# Patient Record
Sex: MALE | URBAN - METROPOLITAN AREA
[De-identification: names, ages, dates, MRNs, and addresses within clinical notes are randomized per-mention and may not be internally consistent; named-entity substitution may affect disease eponyms.]

---

## 2021-09-29 ENCOUNTER — OFFICE VISIT (OUTPATIENT)
Dept: URBAN - METROPOLITAN AREA CLINIC 43 | Facility: CLINIC | Age: 41
End: 2021-09-29
Payer: MEDICAID

## 2021-09-29 DIAGNOSIS — H17.9 CORNEAL SCAR: ICD-10-CM

## 2021-09-29 PROCEDURE — 92004 COMPRE OPH EXAM NEW PT 1/>: CPT | Performed by: OPTOMETRIST

## 2021-09-29 PROCEDURE — 92134 CPTRZ OPH DX IMG PST SGM RTA: CPT | Performed by: OPTOMETRIST

## 2021-09-29 ASSESSMENT — VISUAL ACUITY
OS: 20/25
OD: 20/40

## 2021-09-29 ASSESSMENT — KERATOMETRY
OD: 42.50
OS: 42.13

## 2021-09-29 ASSESSMENT — INTRAOCULAR PRESSURE
OS: 13
OD: 13

## 2021-09-29 NOTE — IMPRESSION/PLAN
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  Plan: Pt recently diagnosed with DM and severe HTN, pt has renal failure and is doing dialysis and is on list for kidney transplant,  Extensive PVR/traction and PDR OU, cont care with PCP, refer for retina consult less than 1 week

## 2021-10-01 ENCOUNTER — OFFICE VISIT (OUTPATIENT)
Dept: URBAN - METROPOLITAN AREA CLINIC 10 | Facility: CLINIC | Age: 41
End: 2021-10-01
Payer: MEDICAID

## 2021-10-01 PROCEDURE — 92004 COMPRE OPH EXAM NEW PT 1/>: CPT | Performed by: OPHTHALMOLOGY

## 2021-10-01 PROCEDURE — 92134 CPTRZ OPH DX IMG PST SGM RTA: CPT | Performed by: OPHTHALMOLOGY

## 2021-10-01 ASSESSMENT — INTRAOCULAR PRESSURE
OD: 12
OS: 14

## 2021-10-01 NOTE — IMPRESSION/PLAN
Impression: Combined forms of age-related cataract DM Plan: NOTE:   Pt understands that cataract will progress after vitrectomy surgery. May be in only few wks/mo, but can be delayed by over a year. Will address cataract thereafter w primary eye team PRN.

## 2021-10-01 NOTE — IMPRESSION/PLAN
Impression: DM2 w PDR prolif retinopathy w TRD traction detach OU Plan: hx: [[20y H/o DM -- Recent dx w Severe HTN, pt Renal failure on Dialysis / future Txplt]] TODAY CONFIRMED w RETINA SURGEON - Extensive PVR/traction and PDR OU -- was OFF MEDS FOR MANY YEARS / Lived in [address] [place] [address] BP / BG BETTER. MUST STABILIZE before surgery. TODAY add Avastin OU injx (see DME dx) and RTC 4w for addt'l injx then surg      RTC 4w pos FA baseline - plan Avastin OU again -- THEN plan surgery OS, OD

## 2021-11-05 ENCOUNTER — OFFICE VISIT (OUTPATIENT)
Dept: URBAN - METROPOLITAN AREA CLINIC 10 | Facility: CLINIC | Age: 41
End: 2021-11-05
Payer: MEDICAID

## 2021-11-05 DIAGNOSIS — H25.813 COMBINED FORMS OF AGE-RELATED CATARACT, BILATERAL: ICD-10-CM

## 2021-11-05 PROCEDURE — 92014 COMPRE OPH EXAM EST PT 1/>: CPT | Performed by: OPHTHALMOLOGY

## 2021-11-05 PROCEDURE — 92242 FLUORESCEIN&ICG ANGIOGRAPHY: CPT | Performed by: OPHTHALMOLOGY

## 2021-11-05 PROCEDURE — 92134 CPTRZ OPH DX IMG PST SGM RTA: CPT | Performed by: OPHTHALMOLOGY

## 2021-11-05 PROCEDURE — 92250 FUNDUS PHOTOGRAPHY W/I&R: CPT | Performed by: OPHTHALMOLOGY

## 2021-11-05 ASSESSMENT — INTRAOCULAR PRESSURE
OD: 11
OS: 11

## 2021-11-05 NOTE — IMPRESSION/PLAN
Impression: DM2 w PDR prolif retinopathy w TRD traction detach OU Plan: hx: [[20y H/o DM - Recent dx w HTN w Renal failure on Dialysis / future Txplt]] CONFIRMED w RETINA SURGEON - Extensive PVR/traction and PDR OU -- was OFF MEDS FOR MANY YEARS / Lived in [address] [place] [address] BP / BG BETTER. MUST STABILIZE before surgery. Pt CLAIMS in 2021 IMPROVED. TODAY Avastin OU injx (see DME dx) RTC Plan surgery OS first VIT / Laser / rmv'l Hmg / TRD repair / AFx Future will need Ce/IOL surgery.   Future do OD VIT / TRD repair

## 2021-11-09 ENCOUNTER — ADULT PHYSICAL (OUTPATIENT)
Dept: URBAN - METROPOLITAN AREA CLINIC 10 | Facility: CLINIC | Age: 41
End: 2021-11-09
Payer: MEDICAID

## 2021-11-09 PROCEDURE — 99203 OFFICE O/P NEW LOW 30 MIN: CPT | Performed by: PHYSICIAN ASSISTANT

## 2021-11-12 ENCOUNTER — SURGERY (OUTPATIENT)
Dept: URBAN - METROPOLITAN AREA SURGERY 5 | Facility: SURGERY | Age: 41
End: 2021-11-12
Payer: MEDICAID

## 2021-11-12 PROCEDURE — 67113 REPAIR RETINAL DETACH CPLX: CPT | Performed by: OPHTHALMOLOGY

## 2021-11-12 RX ORDER — OFLOXACIN 3 MG/ML
0.3 % SOLUTION/ DROPS OPHTHALMIC
Qty: 1 | Refills: 1 | Status: INACTIVE
Start: 2021-11-12 | End: 2022-01-31

## 2021-11-13 ENCOUNTER — POST-OPERATIVE VISIT (OUTPATIENT)
Dept: URBAN - METROPOLITAN AREA CLINIC 10 | Facility: CLINIC | Age: 41
End: 2021-11-13
Payer: MEDICAID

## 2021-11-13 PROCEDURE — 99024 POSTOP FOLLOW-UP VISIT: CPT | Performed by: OPTOMETRIST

## 2021-11-13 ASSESSMENT — INTRAOCULAR PRESSURE: OS: 19

## 2021-11-13 NOTE — IMPRESSION/PLAN
Impression: S/P Posterior Vitrectomy (PPVIT)/ Laser / rmv'l Hmg / TRD repair / AFx; Avastin/ STTA OS - 1 Day. Encounter for surgical aftercare following surgery on a sense organ  Z48.810. Excellent post op course   Post operative instructions reviewed - Condition is improving - Plan: MUST start pred / ofloxacin tid os as directed. Keep f/u Dr. Joelle Espinal as scheduled.

## 2021-11-30 ENCOUNTER — POST-OPERATIVE VISIT (OUTPATIENT)
Dept: URBAN - METROPOLITAN AREA CLINIC 10 | Facility: CLINIC | Age: 41
End: 2021-11-30
Payer: MEDICAID

## 2021-11-30 PROCEDURE — 99024 POSTOP FOLLOW-UP VISIT: CPT | Performed by: OPHTHALMOLOGY

## 2021-11-30 PROCEDURE — 92134 CPTRZ OPH DX IMG PST SGM RTA: CPT | Performed by: OPHTHALMOLOGY

## 2021-11-30 ASSESSMENT — INTRAOCULAR PRESSURE
OD: 11
OS: 14

## 2021-11-30 NOTE — IMPRESSION/PLAN
Impression: DM2 w PDR prolif retinopathy w TRD traction detach OU Plan: hx: [[20y H/o DM - Recent dx w HTN w Renal failure on Dialysis / future Txplt]] CONFIRMED w RETINA SURGEON - Extensive PVR/traction and PDR OU -- was OFF MEDS FOR MANY YEARS / Lived in [address] [place] [address] BP / BG BETTER. MUST STABILIZE before surgery. Pt CLAIMS in 2021 IMPROVED. DONE w 1st eye OS SURGERY -- Nov '21 - VIT/LASER PRP/TRD repair / Avastin/ AFX OS 
      TRACTION / TRD PEELED. Attached. NO post op CME / DME 
       TODAY post op IMPROVED OS. ATTACHED. RTC Plan surgery OD 2nd eye  VIT / Laser / rmv'l Hmg / TRD repair / AFx   OD Future will need Ce/IOL surgery.

## 2021-12-07 ENCOUNTER — ADULT PHYSICAL (OUTPATIENT)
Dept: URBAN - METROPOLITAN AREA CLINIC 24 | Facility: CLINIC | Age: 41
End: 2021-12-07
Payer: MEDICAID

## 2021-12-07 PROCEDURE — 99213 OFFICE O/P EST LOW 20 MIN: CPT | Performed by: PHYSICIAN ASSISTANT

## 2021-12-10 ENCOUNTER — SURGERY (OUTPATIENT)
Dept: URBAN - METROPOLITAN AREA SURGERY 5 | Facility: SURGERY | Age: 41
End: 2021-12-10
Payer: MEDICAID

## 2021-12-10 PROCEDURE — 67113 REPAIR RETINAL DETACH CPLX: CPT | Performed by: OPHTHALMOLOGY

## 2021-12-11 ENCOUNTER — POST-OPERATIVE VISIT (OUTPATIENT)
Dept: URBAN - METROPOLITAN AREA CLINIC 10 | Facility: CLINIC | Age: 41
End: 2021-12-11

## 2021-12-11 PROCEDURE — 99024 POSTOP FOLLOW-UP VISIT: CPT | Performed by: OPTOMETRIST

## 2021-12-11 ASSESSMENT — INTRAOCULAR PRESSURE: OD: 19

## 2021-12-11 NOTE — IMPRESSION/PLAN
Impression: S/P Posterior Vitrectomy (PPVIT)/Laser; rmv'l Hmg / TRD repair/STTA; AFx/Avastin OD - 1 Day. Encounter for surgical aftercare following surgery on a sense organ  Z48.810. Post operative instructions reviewed - Plan: Reviewed with patient post op findings. Healing well. Pt review on drops to use:
Ofloxacin TID x 1 week then d/c Taper Pred 1gtt TID x 1 week, BID x 1 week, QD x 1 week then d/c. Reviewed positioning of head as directed by surgical team. 
ATs QID or PRN for comfort.  
Eye shield nightly as instructed and do not rub surgical eye

## 2021-12-23 ENCOUNTER — OFFICE VISIT (OUTPATIENT)
Dept: URBAN - METROPOLITAN AREA CLINIC 24 | Facility: CLINIC | Age: 41
End: 2021-12-23
Payer: MEDICAID

## 2021-12-23 PROCEDURE — 99024 POSTOP FOLLOW-UP VISIT: CPT | Performed by: OPHTHALMOLOGY

## 2021-12-23 PROCEDURE — 92134 CPTRZ OPH DX IMG PST SGM RTA: CPT | Performed by: OPHTHALMOLOGY

## 2021-12-23 ASSESSMENT — INTRAOCULAR PRESSURE
OS: 17
OD: 9

## 2021-12-23 NOTE — IMPRESSION/PLAN
Impression: DM2 w PDR w TRD traction detach OU - Rpr'd w VIT / TRD rpr Lsr Air OS then OD '21 -- RE-ATTACHED Plan: hx: [[20y H/o DM - Recent dx w HTN w Renal failure on Dialysis / future Txplt -- CONFIRMED w RETINA SURGEON - Extensive PVR/traction and PDR OU -- was OFF MEDS FOR MANY YEARS / Lived in [address] [place] [address] BP / BG BETTER. MUST STABILIZE before surgery. . . in 2021 IMPROVED. DONE w OU EXTENSIVE RD TRD repair DM surgery -- 1st eye OS Nov '21 - VIT/LSER PRP/TRD rpr /Avstn/ AFX OS -- THEN 2nd eye OD Dec '21 VIT / Vanessa Pillion / rmv'l Hmg / TRD repair / AFx   OD]] TODAY post op IMPROVED OS and OD --   ATTACHED. Finish Gtts. URGED TIGHTER BG control. RTC GEN eye 4-5w - Plan MRx v. Ce/IOL surgery.    RETINA Optos 2m

## 2021-12-23 NOTE — IMPRESSION/PLAN
Impression: Combined forms of age-related cataract DM Plan: NOTE:   Pt understands that cataract will progress after vitrectomy surgery. May be in only few wks/mo, but can be delayed by over a year. Will address cataract thereafter w primary eye team PRN.   WORSENING cataract now

## 2022-01-14 ENCOUNTER — POST-OPERATIVE VISIT (OUTPATIENT)
Dept: URBAN - METROPOLITAN AREA CLINIC 10 | Facility: CLINIC | Age: 42
End: 2022-01-14
Payer: MEDICAID

## 2022-01-14 PROCEDURE — 99024 POSTOP FOLLOW-UP VISIT: CPT | Performed by: OPTOMETRIST

## 2022-01-14 ASSESSMENT — INTRAOCULAR PRESSURE
OD: 16
OS: 15

## 2022-01-14 NOTE — IMPRESSION/PLAN
Impression: S/P Posterior Vitrectomy (PPVIT)/Laser; rmv'l Hmg / TRD repair/STTA; AFx/Avastin OD - 35 Days. Encounter for surgical aftercare following surgery on a sense organ  Z48.810. Plan: Macula off detachment. Extensive traction RD/diabetic damage prior to retina sx. See Dr. DILL [facility] within 1 week.

## 2022-01-18 ENCOUNTER — OFFICE VISIT (OUTPATIENT)
Dept: URBAN - METROPOLITAN AREA CLINIC 10 | Facility: CLINIC | Age: 42
End: 2022-01-18
Payer: MEDICAID

## 2022-01-18 PROCEDURE — 99024 POSTOP FOLLOW-UP VISIT: CPT | Performed by: OPHTHALMOLOGY

## 2022-01-18 PROCEDURE — 92134 CPTRZ OPH DX IMG PST SGM RTA: CPT | Performed by: OPHTHALMOLOGY

## 2022-01-18 ASSESSMENT — INTRAOCULAR PRESSURE
OS: 10
OD: 8

## 2022-01-18 NOTE — IMPRESSION/PLAN
Impression: DM2 w PDR w TRD traction detach OU - Rpr'd w VIT / TRD rpr Lsr Air OS then OD '21 -- RE-ATTACHED Plan: hx: [[20y H/o DM - Recent dx w HTN w Dialysis / future renal Txplt - PVR/traction w PDR OU severe - was OFF MEDS FOR MANY YRS in [facility] - MUST CONTROL BP/BG - MUST STABILIZE for surg . . in '21 IMPRV'd. DONE w OU EXTENSIVE RD TRD rpr surg - 1st eye OS Nov '21 - VIT/LSER PRP/TRD rpr /Avstn/ AFX OS -- THEN 2nd eye OD Dec '21 VIT / Elyssa Georgis / rmv'l Hmg / TRD repair / AFx OD --- ATTACHED]] TODAY IMPROVED OS     ATTACHED, no TRD recur. No DME. RIGHT EYE RECUR RD MAC OFF TOTAL w PVR severe and worse Cataract URGED TIGHTER BG control. May need DRCR. net maintain injx future RTC  ASAP for Ce/IOL OD then VIT OD to follow      Once Ce/IOL done do REPEAT VIT / PVR peel / Laser / PI / Likely OIL 5k OD

## 2022-01-18 NOTE — IMPRESSION/PLAN
Impression: Combined forms of age-related cataract DM Plan: NOTE:   Pt understands that cataract will progress after vitrectomy surgery. May be in only few wks/mo, but can be delayed by over a year. Will address cataract thereafter w primary eye team PRN.  TODAY confirmed WORSENING cataract Uriel PALMER

## 2022-01-20 ENCOUNTER — OFFICE VISIT (OUTPATIENT)
Dept: URBAN - METROPOLITAN AREA CLINIC 10 | Facility: CLINIC | Age: 42
End: 2022-01-20
Payer: MEDICAID

## 2022-01-20 PROCEDURE — 92014 COMPRE OPH EXAM EST PT 1/>: CPT | Performed by: STUDENT IN AN ORGANIZED HEALTH CARE EDUCATION/TRAINING PROGRAM

## 2022-01-20 ASSESSMENT — INTRAOCULAR PRESSURE
OS: 14
OD: 12

## 2022-01-20 ASSESSMENT — VISUAL ACUITY
OS: 20/40
OD: LP

## 2022-01-20 NOTE — IMPRESSION/PLAN
Impression: DM2 w PDR w TRD traction detach OU - Rpr'd w VIT / TRD rpr Lsr Air OS then OD '21 -- RE-ATTACHED Plan: Continue care with Dr. Edilia Casillas

## 2022-01-20 NOTE — IMPRESSION/PLAN
Impression: Combined forms of age-related cataract DM Plan:  Discussed cataracts with patient. Discussed treatment options. Surgical treatment is recommended. Surgical risks and benefits discussed. Patient elects surgical treatment. Recommend surgery OU, OD first. Aim OD: undecided. Patient will need glasses for full time wear. 
Can due OS when okay with retina

## 2022-01-28 ENCOUNTER — TESTING ONLY (OUTPATIENT)
Dept: URBAN - METROPOLITAN AREA CLINIC 10 | Facility: CLINIC | Age: 42
End: 2022-01-28
Payer: MEDICAID

## 2022-01-28 DIAGNOSIS — E11.3533 TYPE 2 DIABETES MELLITUS WITH PROLIFERATIVE DIABETIC RETINOPATHY WITH TRACTION RETINAL DETACHMENT NOT INVOLVING THE MACULA, BILATERAL: ICD-10-CM

## 2022-01-28 DIAGNOSIS — Z01.818 ENCOUNTER FOR OTHER PREPROCEDURAL EXAMINATION: Primary | ICD-10-CM

## 2022-01-28 PROCEDURE — 99213 OFFICE O/P EST LOW 20 MIN: CPT | Performed by: PHYSICIAN ASSISTANT

## 2022-01-31 ENCOUNTER — PRE-OPERATIVE VISIT (OUTPATIENT)
Dept: URBAN - METROPOLITAN AREA CLINIC 10 | Facility: CLINIC | Age: 42
End: 2022-01-31
Payer: MEDICAID

## 2022-01-31 DIAGNOSIS — H25.11 AGE-RELATED NUCLEAR CATARACT, RIGHT EYE: ICD-10-CM

## 2022-01-31 DIAGNOSIS — H44.512 ABSOLUTE GLAUCOMA, LEFT EYE: ICD-10-CM

## 2022-01-31 DIAGNOSIS — H44.511 ABSOLUTE GLAUCOMA, RIGHT EYE: Primary | ICD-10-CM

## 2022-01-31 PROCEDURE — 99024 POSTOP FOLLOW-UP VISIT: CPT | Performed by: OPHTHALMOLOGY

## 2022-01-31 RX ORDER — KETOROLAC TROMETHAMINE 5 MG/ML
0.5 % SOLUTION OPHTHALMIC
Qty: 5 | Refills: 1 | Status: ACTIVE
Start: 2022-01-31

## 2022-01-31 RX ORDER — OFLOXACIN 3 MG/ML
0.3 % SOLUTION/ DROPS OPHTHALMIC
Qty: 1 | Refills: 1 | Status: ACTIVE
Start: 2022-01-31

## 2022-01-31 RX ORDER — PREDNISOLONE ACETATE 10 MG/ML
1 % SUSPENSION/ DROPS OPHTHALMIC
Qty: 10 | Refills: 1 | Status: INACTIVE
Start: 2022-01-31 | End: 2022-01-31

## 2022-01-31 RX ORDER — PREDNISOLONE ACETATE 10 MG/ML
1 % SUSPENSION/ DROPS OPHTHALMIC
Qty: 10 | Refills: 1 | Status: ACTIVE
Start: 2022-01-31

## 2022-01-31 ASSESSMENT — INTRAOCULAR PRESSURE
OS: 16
OD: 14

## 2022-01-31 NOTE — IMPRESSION/PLAN
Impression: DM2 w PDR w TRD traction detach OU - Rpr'd w VIT / TRD rpr Lsr Air OS then OD '21 -- RE-ATTACHED Plan: Followed by Dr. Yoly Teixeira 

hx: Lucy.More H/o DM - Recent dx w HTN w Renal failure on Dialysis / future Txplt -- CONFIRMED w RETINA SURGEON - Extensive PVR/traction and PDR OU -- was OFF MEDS FOR MANY YEARS / Lived in [address] [place] [address] BP / BG BETTER. MUST STABILIZE before surgery. . . in 2021 IMPROVED. DONE w OU EXTENSIVE RD TRD repair DM surgery -- 1st eye OS Nov '21 - VIT/LSER PRP/TRD rpr /Avstn/ AFX OS -- THEN 2nd eye OD Dec '21 VIT / Elyssa Georgis / rmv'l Hmg / TRD repair / AFx   OD]] TODAY post op IMPROVED OS and OD --   ATTACHED. Finish Gtts. URGED TIGHTER BG control. RTC GEN eye 4-5w - Plan MRx v. Ce/IOL surgery.    RETINA Optos 2m

## 2022-01-31 NOTE — IMPRESSION/PLAN
Impression: Age-related nuclear cataract, right eye: H25.11. Plan: Discussed cataract diagnosis with the patient. Discussed risks, benefits and alternatives to surgery including but not limited to: bleeding, infection, risk of vision loss, loss of the eye, need for other surgery. Patient voiced understanding and wishes to proceed. Patient elects surgical treatment. Patient desires surgery OU. Patient understands the need for glasses after surgery for BCVA. Right eye first (PC IOL (Standard ) (PLANO DISTANCE AIM:)  (NO ORA, NO LRI, NO LENSX) NO UPGRADES (+) BLOCK (+) + Suture wound (will have RD repair in 1 week after cat surgery 10 Minutes +Topical Postop Drops

## 2022-01-31 NOTE — IMPRESSION/PLAN
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 Plan: Pt has Glaucoma      Today's IOP :    14/16     Tmax  :  19 ou Target IOP low to mid teens Pt denies Fhx of Glaucoma Left eye is the better seeing eye VF: not on file C/D:  .2x.2 ou
OCT: not on file Pt denies Sulfa Allergy // Pt denies Lung /Heart dx Plan :
1.  IOP and condition appear stable today. No changes being made to current regimen. Recommend monitoring condition at this time. 
2. Return As scheduled for cat sx od

## 2022-01-31 NOTE — IMPRESSION/PLAN
Impression: Absolute glaucoma, left eye: H44.512. Plan: Will plan to remove Cat OS with topical in near future due to [facility] plaque Will have pt return for CEIOL OS after RD repair OD at Dr. Anni Desir discretion

## 2022-02-11 ENCOUNTER — SURGERY (OUTPATIENT)
Dept: URBAN - METROPOLITAN AREA SURGERY 5 | Facility: SURGERY | Age: 42
End: 2022-02-11
Payer: MEDICAID

## 2022-02-11 DIAGNOSIS — H40.1131 PRIMARY OPEN-ANGLE GLAUCOMA, BILATERAL, MILD STAGE: ICD-10-CM

## 2022-02-11 PROCEDURE — 66982 XCAPSL CTRC RMVL CPLX WO ECP: CPT | Performed by: OPHTHALMOLOGY

## 2022-02-12 ENCOUNTER — POST-OPERATIVE VISIT (OUTPATIENT)
Dept: URBAN - METROPOLITAN AREA CLINIC 10 | Facility: CLINIC | Age: 42
End: 2022-02-12
Payer: MEDICAID

## 2022-02-12 DIAGNOSIS — Z48.810 ENCOUNTER FOR SURGICAL AFTERCARE FOLLOWING SURGERY ON A SENSE ORGAN: Primary | ICD-10-CM

## 2022-02-12 PROCEDURE — 99024 POSTOP FOLLOW-UP VISIT: CPT | Performed by: STUDENT IN AN ORGANIZED HEALTH CARE EDUCATION/TRAINING PROGRAM

## 2022-02-14 NOTE — IMPRESSION/PLAN
Impression: S/P Cataract Extraction by phacoemulsification with IOL placement; suture wound OD - 1 Day. Encounter for surgical aftercare following surgery on a sense organ  Z48.810. Excellent post op course   Post operative instructions reviewed  Plan: good IOP. Restrictions discussed. Call with worsening pain or vision. 

Continue PO gtts as directed

## 2022-02-18 ENCOUNTER — SURGERY (OUTPATIENT)
Dept: URBAN - METROPOLITAN AREA SURGERY 5 | Facility: SURGERY | Age: 42
End: 2022-02-18
Payer: MEDICAID

## 2022-02-18 PROCEDURE — 67113 REPAIR RETINAL DETACH CPLX: CPT | Performed by: OPHTHALMOLOGY

## 2022-02-19 ENCOUNTER — POST-OPERATIVE VISIT (OUTPATIENT)
Dept: URBAN - METROPOLITAN AREA CLINIC 10 | Facility: CLINIC | Age: 42
End: 2022-02-19

## 2022-02-19 PROCEDURE — 99024 POSTOP FOLLOW-UP VISIT: CPT | Performed by: OPTOMETRIST

## 2022-02-19 ASSESSMENT — INTRAOCULAR PRESSURE: OD: 20

## 2022-02-19 NOTE — IMPRESSION/PLAN
Impression:  Encounter for surgical aftercare following surgery on a sense organ  Z48.810. Plan: Healing well. Use drops as prescribed. Patient understands positioning for next 48 hours.

## 2022-03-08 ENCOUNTER — OFFICE VISIT (OUTPATIENT)
Dept: URBAN - METROPOLITAN AREA CLINIC 10 | Facility: CLINIC | Age: 42
End: 2022-03-08
Payer: MEDICAID

## 2022-03-08 DIAGNOSIS — E11.3513 TYPE 2 DIABETES MELLITUS W/ PROLIFERATIVE DIABETIC RETINOPATHY W/ MACULAR EDEMA, BILATERAL: Primary | ICD-10-CM

## 2022-03-08 PROCEDURE — 92134 CPTRZ OPH DX IMG PST SGM RTA: CPT | Performed by: OPHTHALMOLOGY

## 2022-03-08 PROCEDURE — 99024 POSTOP FOLLOW-UP VISIT: CPT | Performed by: OPHTHALMOLOGY

## 2022-03-08 ASSESSMENT — INTRAOCULAR PRESSURE
OD: 10
OS: 10

## 2022-03-08 NOTE — IMPRESSION/PLAN
Impression: DM2 w PDR w TRD traction detach OU - Rpr'd w VIT / TRD rpr Lsr Air OS then OD '21 -- RE-ATTACHED (Repeated VIT / retinectomy/laser 5k OIL OD '22) Plan: hx: [[20y H/o DM - Recent dx w HTN w Dialysis / future renal Txplt - PVR/traction w PDR OU severe - was OFF MEDS FOR MANY YRS in [facility] - MUST CONTROL BP/BG - MUST STABILIZE for surg . . in '21 IMPRV'd. DONE w OU EXTENSIVE RD TRD rpr surg - 1st eye OS Nov '21 - VIT/LSER PRP/TRD rpr /Avstn/ AFX OS -- THEN 2nd eye OD Dec '21 VIT / Elyssa Georgis / rmv'l Hmg / TRD repair / AFx OD --- ATTACHED]] RIGHT EYE RECUR RD MAC OFF TOTAL w PVR severe DONE  (Repeated VIT / retinectomy/laser 5k OIL OD '22) TODAY re-attached OS stable. OD UNDER 5k OIL in place. URGED TIGHTER BG control. May need DRCR. net maintain injx future RTC Gen eye 1mo / RETINA 6-8w Pos Optos and exam.   Future injx?

## 2022-04-05 ENCOUNTER — POST-OPERATIVE VISIT (OUTPATIENT)
Dept: URBAN - METROPOLITAN AREA CLINIC 10 | Facility: CLINIC | Age: 42
End: 2022-04-05
Payer: MEDICAID

## 2022-04-05 PROCEDURE — 99024 POSTOP FOLLOW-UP VISIT: CPT | Performed by: STUDENT IN AN ORGANIZED HEALTH CARE EDUCATION/TRAINING PROGRAM

## 2022-04-05 ASSESSMENT — INTRAOCULAR PRESSURE
OD: 14
OS: 13

## 2022-04-05 NOTE — IMPRESSION/PLAN
Impression:  Encounter for surgical aftercare following surgery on a sense organ  Z48.810. Plan: Reviewed post-op instructions. RTC if any pain or sudden changes to vision.

## 2022-05-03 ENCOUNTER — OFFICE VISIT (OUTPATIENT)
Dept: URBAN - METROPOLITAN AREA CLINIC 10 | Facility: CLINIC | Age: 42
End: 2022-05-03
Payer: MEDICAID

## 2022-05-03 DIAGNOSIS — H44.512 ABSOLUTE GLAUCOMA, LEFT EYE: ICD-10-CM

## 2022-05-03 DIAGNOSIS — E11.3533 TYPE 2 DIABETES MELLITUS WITH PROLIFERATIVE DIABETIC RETINOPATHY WITH TRACTION RETINAL DETACHMENT NOT INVOLVING THE MACULA, BILATERAL: Primary | ICD-10-CM

## 2022-05-03 PROCEDURE — 99024 POSTOP FOLLOW-UP VISIT: CPT | Performed by: OPHTHALMOLOGY

## 2022-05-03 PROCEDURE — 92134 CPTRZ OPH DX IMG PST SGM RTA: CPT | Performed by: OPHTHALMOLOGY

## 2022-05-03 ASSESSMENT — INTRAOCULAR PRESSURE
OD: 15
OS: 15

## 2022-05-03 NOTE — IMPRESSION/PLAN
Impression: DM2 w PDR w TRD traction detach OU - Rpr'd w VIT / TRD rpr Lsr Air OS then OD '21 -- RE-ATTACHED (Repeated VIT / retinectomy/laser 5k OIL OD '22) Plan: hx: [[20y H/o DM - Recent dx w HTN w Dialysis / future renal Txplt - PVR/traction w PDR OU severe - was OFF MEDS FOR MANY YRS in [facility] - MUST CONTROL BP/BG - MUST STABILIZE for surg . . in '21 IMPRV'd. DONE w OU EXTENSIVE RD TRD rpr surg - 1st eye OS Nov '21 - VIT/LSER PRP/TRD rpr /Avstn/ AFX OS -- THEN 2nd eye OD Dec '21 VIT / Elyssa Georgis / rmv'l Hmg / TRD repair / AFx OD --- ATTACHED]] RIGHT EYE RECUR RD MAC OFF TOTAL w PVR severe DONE  (Repeated VIT / retinectomy/laser 5k OIL OD '22) TODAY re-attached OS stable. OD UNDER 5k OIL permanent URGED TIGHTER BG care    May need [facility] net maintain injx future RTC Gen eye 2mo CE / RETINA 3m Pos Optos and exam.   Future injx if recur?

## 2022-05-03 NOTE — IMPRESSION/PLAN
Impression: Absolute glaucoma, left eye: H44.512. Plan: Will plan to remove Cat OS with topical in near future due to [facility] plaque Will have pt return for CEIOL OS now w Dr. Alexa Chung if desired

## 2022-07-07 ENCOUNTER — OFFICE VISIT (OUTPATIENT)
Dept: URBAN - METROPOLITAN AREA CLINIC 10 | Facility: CLINIC | Age: 42
End: 2022-07-07
Payer: MEDICAID

## 2022-07-07 DIAGNOSIS — H44.512 ABSOLUTE GLAUCOMA, LEFT EYE: ICD-10-CM

## 2022-07-07 DIAGNOSIS — E11.3533 TYPE 2 DIABETES MELLITUS WITH PROLIFERATIVE DIABETIC RETINOPATHY WITH TRACTION RETINAL DETACHMENT NOT INVOLVING THE MACULA, BILATERAL: Primary | ICD-10-CM

## 2022-07-07 DIAGNOSIS — H25.812 COMBINED FORMS OF AGE-RELATED CATARACT, LEFT EYE: ICD-10-CM

## 2022-07-07 PROCEDURE — 92014 COMPRE OPH EXAM EST PT 1/>: CPT | Performed by: STUDENT IN AN ORGANIZED HEALTH CARE EDUCATION/TRAINING PROGRAM

## 2022-07-07 PROCEDURE — 92134 CPTRZ OPH DX IMG PST SGM RTA: CPT | Performed by: STUDENT IN AN ORGANIZED HEALTH CARE EDUCATION/TRAINING PROGRAM

## 2022-07-07 ASSESSMENT — VISUAL ACUITY
OD: CF
OS: 20/30

## 2022-07-07 ASSESSMENT — INTRAOCULAR PRESSURE
OS: 14
OD: 14

## 2022-07-07 NOTE — IMPRESSION/PLAN
Impression: Combined forms of age-related cataract, left eye: H25.812. Plan:  Discussed cataracts with patient. Discussed treatment options. Surgical treatment is recommended. Surgical risks and benefits discussed. Patient elects surgical treatment. Recommend surgery OS. Patient is candidate/interested in standard lens, Aim OD: -0.25. Aim OS: -0.25. Glaucoma surgeon recommended. 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

## 2022-07-07 NOTE — IMPRESSION/PLAN
Impression: DM2 w PDR w TRD traction detach OU - Rpr'd w VIT / TRD rpr Lsr Air OS then OD '21 -- RE-ATTACHED (Repeated VIT / retinectomy/laser 5k OIL OD '22) Plan: Flat 360, stable. Continue care with retina.

## 2022-07-07 NOTE — IMPRESSION/PLAN
Impression: Absolute glaucoma, left eye: H44.512.  Plan: Continue care with Dr. Ava Jackson, diagnosed 01/31/22, no current treatment, referred for cataract eval, possible JANEY's

## 2022-08-02 ENCOUNTER — OFFICE VISIT (OUTPATIENT)
Dept: URBAN - METROPOLITAN AREA CLINIC 10 | Facility: CLINIC | Age: 42
End: 2022-08-02
Payer: MEDICAID

## 2022-08-02 DIAGNOSIS — H44.512 ABSOLUTE GLAUCOMA, LEFT EYE: ICD-10-CM

## 2022-08-02 DIAGNOSIS — E11.3533 TYPE 2 DIABETES MELLITUS WITH PROLIFERATIVE DIABETIC RETINOPATHY WITH TRACTION RETINAL DETACHMENT NOT INVOLVING THE MACULA, BILATERAL: Primary | ICD-10-CM

## 2022-08-02 PROCEDURE — 92014 COMPRE OPH EXAM EST PT 1/>: CPT | Performed by: OPHTHALMOLOGY

## 2022-08-02 PROCEDURE — 92134 CPTRZ OPH DX IMG PST SGM RTA: CPT | Performed by: OPHTHALMOLOGY

## 2022-08-02 PROCEDURE — 92250 FUNDUS PHOTOGRAPHY W/I&R: CPT | Performed by: OPHTHALMOLOGY

## 2022-08-02 ASSESSMENT — INTRAOCULAR PRESSURE
OS: 8
OD: 13

## 2022-08-02 NOTE — IMPRESSION/PLAN
Impression: Absolute glaucoma [place] OS Plan: Will plan to remove Cat OS with Dr. Ramya Bolton soon. IOP care as well.

## 2022-08-02 NOTE — IMPRESSION/PLAN
Impression: DM2 PDR w TRD detach OU -- Rpr'd w VIT/ TRD rpr Lsr Air OS then OD '21 -- RE-ATTACHED (Repeat VIT /retinectomy/lsr 5k OIL OD '22) Plan: hx: [[20y H/o DM - Recent dx w HTN w Dialysis / future renal Txplt - PVR/traction w PDR OU severe - was OFF MEDS FOR MANY YRS in [facility] - MUST CONTROL BP/BG - MUST STABILIZE for surg . . in '21 IMPRV'd. DONE w OU EXTENSIVE RD TRD rpr surg - 1st eye OS Nov '21 - VIT/LSER PRP/TRD rpr /Avstn/ AFX OS -- THEN 2nd eye OD Dec '21 VIT / Ashleigh Violeta / rmv'l Hmg / TRD repair / AFx OD --- ATTACHED]] RIGHT EYE RECUR RD MAC OFF TOTAL w PVR severe -- NOW REPAIRED 
        (Repeated VIT / retinectomy/laser 5k OIL OD '22) TODAY OS re-attached stable. OD UNDER 5k OIL permanent OIL OD 
    URGED TIGHTER BG care   May need [facility] net inj if Hmg  future ? RTC Ce/IOL and IOP Care as planned Dr. Alvarez Gathers RTC RETINA 4m Pos Optos and exam.   Future injx if recur hmg / DME ?

## 2022-08-03 ENCOUNTER — ADULT PHYSICAL (OUTPATIENT)
Dept: URBAN - METROPOLITAN AREA CLINIC 10 | Facility: CLINIC | Age: 42
End: 2022-08-03
Payer: MEDICAID

## 2022-08-03 DIAGNOSIS — H25.812 COMBINED FORMS OF AGE-RELATED CATARACT, LEFT EYE: ICD-10-CM

## 2022-08-03 DIAGNOSIS — Z01.818 ENCOUNTER FOR OTHER PREPROCEDURAL EXAMINATION: Primary | ICD-10-CM

## 2022-08-03 PROCEDURE — 99213 OFFICE O/P EST LOW 20 MIN: CPT | Performed by: PHYSICIAN ASSISTANT

## 2022-08-10 ENCOUNTER — PRE-OPERATIVE VISIT (OUTPATIENT)
Dept: URBAN - METROPOLITAN AREA CLINIC 24 | Facility: CLINIC | Age: 42
End: 2022-08-10
Payer: MEDICAID

## 2022-08-10 DIAGNOSIS — H25.11 AGE-RELATED NUCLEAR CATARACT, RIGHT EYE: ICD-10-CM

## 2022-08-10 DIAGNOSIS — H44.512 ABSOLUTE GLAUCOMA, LEFT EYE: ICD-10-CM

## 2022-08-10 DIAGNOSIS — H25.812 COMBINED FORMS OF AGE-RELATED CATARACT, LEFT EYE: ICD-10-CM

## 2022-08-10 DIAGNOSIS — E11.3533 TYPE 2 DIABETES MELLITUS WITH PROLIFERATIVE DIABETIC RETINOPATHY WITH TRACTION RETINAL DETACHMENT NOT INVOLVING THE MACULA, BILATERAL: ICD-10-CM

## 2022-08-10 DIAGNOSIS — H44.511 ABSOLUTE GLAUCOMA, RIGHT EYE: Primary | ICD-10-CM

## 2022-08-10 PROCEDURE — 99214 OFFICE O/P EST MOD 30 MIN: CPT | Performed by: OPHTHALMOLOGY

## 2022-08-10 ASSESSMENT — INTRAOCULAR PRESSURE
OS: 8
OD: 17

## 2022-08-10 ASSESSMENT — VISUAL ACUITY
OD: 20/80
OS: 20/20

## 2022-08-10 ASSESSMENT — KERATOMETRY
OS: 42.16
OD: 42.19

## 2022-08-10 NOTE — IMPRESSION/PLAN
Impression: Absolute glaucoma, left eye: H44.512. Plan: Will plan to remove Cat OS with topical in near future due to [facility] plaque Will have pt return for CEIOL OS after RD repair OD at Dr. An Alcala discretion

## 2022-08-10 NOTE — IMPRESSION/PLAN
Impression: Combined forms of age-related cataract, left eye: H25.812. Plan: (( (( OS AIM -0.25,   DEXYCU 1st choice, Topical, ORA - if pt elects, NO LenSx) - Min 5-10 Discussed cataract diagnosis with the patient. Appropriate testing ordered for cataract diagnosis prior to Preop. Risks and benefits of surgical treatment were discussed and understood. Patient desires surgical treatment. Discussed lens options with pt and pt is ok with wearing glasses after surgery. Patient desires surgery to proceed with surgery. Both eyes examined, medically necessary due to impact in activities of daily living. Discussed higher risks with smaller pupil and discussed iris stretch and higher risks of bleeding.  Discussed there is a chance of developing capsular haze after surgery, which may be corrected with laser/yag

## 2022-08-10 NOTE — IMPRESSION/PLAN
Impression: DM2 w PDR w TRD traction detach OU - Rpr'd w VIT / TRD rpr Lsr Air OS then OD '21 -- RE-ATTACHED Plan: Followed by Dr. Christopher Mcfarland 

hx: Egeria.Buff H/o DM - Recent dx w HTN w Renal failure on Dialysis / future Txplt -- CONFIRMED w RETINA SURGEON - Extensive PVR/traction and PDR OU -- was OFF MEDS FOR MANY YEARS / Lived in [address] [place] [address] BP / BG BETTER. MUST STABILIZE before surgery. . . in 2021 IMPROVED. DONE w OU EXTENSIVE RD TRD repair DM surgery -- 1st eye OS Nov '21 - VIT/LSER PRP/TRD rpr /Avstn/ AFX OS -- THEN 2nd eye OD Dec '21 VIT / [employer] / rmv'l Hmg / TRD repair / AFx   OD]] TODAY post op IMPROVED OS and OD --   ATTACHED. Finish Gtts. URGED TIGHTER BG control. RTC GEN eye 4-5w - Plan MRx v. Ce/IOL surgery.    RETINA Optos 2m

## 2022-08-10 NOTE — IMPRESSION/PLAN
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 Plan: Pt has Glaucoma      Today's IOP :    17/8     Tmax  :  19 ou Target IOP low to mid teens Pt denies Fhx of Glaucoma Left eye is the better seeing eye VF: not on file C/D:  .4 OD // 0.35 OS
OCT: not on file Pt denies Sulfa Allergy // Pt denies Lung /Heart dx Plan :
1.  IOP and condition appear stable today. No changes being made to current regimen. Recommend monitoring condition at this time. 
2. Return As scheduled for cat sx oS

## 2022-08-15 ENCOUNTER — SURGERY (OUTPATIENT)
Dept: URBAN - METROPOLITAN AREA SURGERY 5 | Facility: SURGERY | Age: 42
End: 2022-08-15
Payer: MEDICAID

## 2022-08-15 DIAGNOSIS — H25.812 COMBINED FORMS OF AGE-RELATED CATARACT, LEFT EYE: Primary | ICD-10-CM

## 2022-08-15 PROCEDURE — 66984 XCAPSL CTRC RMVL W/O ECP: CPT | Performed by: OPHTHALMOLOGY

## 2022-08-17 ENCOUNTER — POST-OPERATIVE VISIT (OUTPATIENT)
Dept: URBAN - METROPOLITAN AREA CLINIC 10 | Facility: CLINIC | Age: 42
End: 2022-08-17

## 2022-08-17 DIAGNOSIS — Z96.1 PRESENCE OF INTRAOCULAR LENS: Primary | ICD-10-CM

## 2022-08-17 PROCEDURE — 99024 POSTOP FOLLOW-UP VISIT: CPT | Performed by: OPTOMETRIST

## 2022-08-17 ASSESSMENT — INTRAOCULAR PRESSURE: OS: 8

## 2022-08-17 NOTE — IMPRESSION/PLAN
Impression: S/P Cataract Extraction/IOL OS - 2 Days. Presence of intraocular lens  Z96.1.  Excellent post op course   Post operative instructions reviewed - Plan: RTC for 3wk PO

## 2022-09-06 ENCOUNTER — POST-OPERATIVE VISIT (OUTPATIENT)
Dept: URBAN - METROPOLITAN AREA CLINIC 10 | Facility: CLINIC | Age: 42
End: 2022-09-06
Payer: MEDICAID

## 2022-09-06 DIAGNOSIS — Z96.1 PRESENCE OF INTRAOCULAR LENS: Primary | ICD-10-CM

## 2022-09-06 PROCEDURE — 99024 POSTOP FOLLOW-UP VISIT: CPT | Performed by: STUDENT IN AN ORGANIZED HEALTH CARE EDUCATION/TRAINING PROGRAM

## 2022-09-06 ASSESSMENT — VISUAL ACUITY
OD: 20/125
OS: 20/30

## 2022-09-06 ASSESSMENT — INTRAOCULAR PRESSURE
OS: 13
OD: 14

## 2022-09-06 NOTE — IMPRESSION/PLAN
Impression:  Presence of intraocular lens  Z96.1. Post operative instructions reviewed - Plan: Reviewed post-op instructions. RTC if any pain or sudden changes to vision. 
Recommended glasses Rx for full-time wear

## 2023-02-21 ENCOUNTER — OFFICE VISIT (OUTPATIENT)
Dept: URBAN - METROPOLITAN AREA CLINIC 10 | Facility: CLINIC | Age: 43
End: 2023-02-21
Payer: MEDICAID

## 2023-02-21 DIAGNOSIS — H44.512 ABSOLUTE GLAUCOMA, LEFT EYE: ICD-10-CM

## 2023-02-21 DIAGNOSIS — E11.3533 TYPE 2 DIABETES MELLITUS WITH PROLIFERATIVE DIABETIC RETINOPATHY WITH TRACTION RETINAL DETACHMENT NOT INVOLVING THE MACULA, BILATERAL: Primary | ICD-10-CM

## 2023-02-21 PROCEDURE — 92014 COMPRE OPH EXAM EST PT 1/>: CPT | Performed by: OPHTHALMOLOGY

## 2023-02-21 PROCEDURE — 92134 CPTRZ OPH DX IMG PST SGM RTA: CPT | Performed by: OPHTHALMOLOGY

## 2023-02-21 ASSESSMENT — INTRAOCULAR PRESSURE
OD: 18
OS: 16

## 2023-02-21 NOTE — IMPRESSION/PLAN
Impression: DM2 PDR w TRD detach OU -- Rpr'd w VIT/ TRD rpr Lsr Air OS then OD '21 -- RE-ATTACHED (Repeat VIT /retinectomy/lsr 5k OIL OD '22) Plan: hx: [[20y H/o DM - Recent dx w HTN w Dialysis / future renal Txplt - PVR/traction w PDR OU severe - was OFF MEDS FOR MANY YRS in [facility] - MUST CONTROL BP/BG - MUST STABILIZE for surg . . in '21 IMPRV'd. DONE w OU EXTENSIVE RD TRD rpr surg - 1st eye OS Nov '21 - VIT/LSER PRP/TRD rpr /Avstn/ AFX OS -- THEN 2nd eye OD Dec '21 VIT / Ernestineyth Vianey / rmv'l Hmg / TRD repair / AFx OD --- ATTACHED]] RIGHT EYE RECUR RD MAC OFF TOTAL w PVR severe -- NOW REPAIRED 
        (Repeat VIT / retinectomy/laser 5k OIL OD '22 -- LEAVE OIL OD) OS Re-attached stable. Viki Diaz good [facility] ---  OD UNDER 5k OIL permanent OIL OD 
    URGED BG care  -- Monitor if recur HMG or DME. KEEP Gen eye f/u 
RTC RETINA 12m plan Optos and exam.   GEN eye care RTC 4w.   Re-establish

## 2023-02-21 NOTE — IMPRESSION/PLAN
Impression: Absolute glaucoma, left Plan:  Sari Day  -- keep f/u [facility] eye care

## 2023-04-25 ENCOUNTER — OFFICE VISIT (OUTPATIENT)
Dept: URBAN - METROPOLITAN AREA CLINIC 10 | Facility: CLINIC | Age: 43
End: 2023-04-25
Payer: MEDICAID

## 2023-04-25 DIAGNOSIS — H44.512 ABSOLUTE GLAUCOMA, LEFT EYE: ICD-10-CM

## 2023-04-25 DIAGNOSIS — E11.3533 TYPE 2 DIABETES MELLITUS WITH PROLIFERATIVE DIABETIC RETINOPATHY WITH TRACTION RETINAL DETACHMENT NOT INVOLVING THE MACULA, BILATERAL: ICD-10-CM

## 2023-04-25 DIAGNOSIS — H26.493 OTHER SECONDARY CATARACT, BILATERAL: Primary | ICD-10-CM

## 2023-04-25 DIAGNOSIS — H40.013 OPEN ANGLE WITH BORDERLINE FINDINGS, LOW RISK, BILATERAL: ICD-10-CM

## 2023-04-25 PROCEDURE — 92134 CPTRZ OPH DX IMG PST SGM RTA: CPT | Performed by: OPTOMETRIST

## 2023-04-25 PROCEDURE — 92014 COMPRE OPH EXAM EST PT 1/>: CPT | Performed by: OPTOMETRIST

## 2023-04-25 PROCEDURE — 92133 CPTRZD OPH DX IMG PST SGM ON: CPT | Performed by: OPTOMETRIST

## 2023-04-25 ASSESSMENT — INTRAOCULAR PRESSURE
OD: 19
OS: 18

## 2023-04-25 ASSESSMENT — VISUAL ACUITY
OS: 20/20
OD: 20/100

## 2023-04-25 NOTE — IMPRESSION/PLAN
Impression: Other secondary cataract, bilateral: H26.493. Plan: Discussed diagnosis in detail with patient. Discussed treatment options with patient. Discussed risks and benefits and patient understands. Pt. elects YAG laser OS. OD vision limited by retina. Monitor.

## 2023-04-25 NOTE — IMPRESSION/PLAN
Impression: Open angle with borderline findings, low risk, bilateral: H40.013. Plan: Pt. has seen glc. No h/o tx. IOP normal OU today. Tests limited by PRP/RD repair OU. NFL OCT: early inf thinning OD, early sup thinning and thin inf OS. Cont. to monitor s tx. RTC 6 months for IOP check and order HVF 24-2.

## 2023-04-25 NOTE — IMPRESSION/PLAN
Impression: DM2 PDR w TRD detach OU -- Rpr'd w VIT/ TRD rpr Lsr Air OS then OD '21 -- RE-ATTACHED (Repeat VIT /retinectomy/lsr 5k OIL OD '22) Plan: hx: [[20y H/o DM - Recent dx w HTN w Dialysis / future renal Txplt - PVR/traction w PDR OU severe - was OFF MEDS FOR MANY YRS in [facility] - MUST CONTROL BP/BG - MUST STABILIZE for surg . . in '21 IMPRV'd. DONE w OU EXTENSIVE RD TRD rpr surg - 1st eye OS Nov '21 - VIT/LSER PRP/TRD rpr /Avstn/ AFX OS -- THEN 2nd eye OD Dec '21 VIT / Simona Roughen / rmv'l Hmg / TRD repair / AFx OD --- ATTACHED]] Stable today. Will leave oil OD per retina.

## 2023-07-11 ENCOUNTER — OFFICE VISIT (OUTPATIENT)
Dept: URBAN - METROPOLITAN AREA CLINIC 10 | Facility: CLINIC | Age: 43
End: 2023-07-11
Payer: COMMERCIAL

## 2023-07-11 DIAGNOSIS — H40.013 OPEN ANGLE WITH BORDERLINE FINDINGS, LOW RISK, BILATERAL: ICD-10-CM

## 2023-07-11 DIAGNOSIS — E11.3533 TYPE 2 DIABETES MELLITUS WITH PROLIFERATIVE DIABETIC RETINOPATHY WITH TRACTION RETINAL DETACHMENT NOT INVOLVING THE MACULA, BILATERAL: Primary | ICD-10-CM

## 2023-07-11 DIAGNOSIS — H26.493 OTHER SECONDARY CATARACT, BILATERAL: ICD-10-CM

## 2023-07-11 DIAGNOSIS — H40.023 OPEN ANGLE WITH BORDERLINE FINDINGS, HIGH RISK, BILATERAL: ICD-10-CM

## 2023-07-11 PROCEDURE — 99213 OFFICE O/P EST LOW 20 MIN: CPT | Performed by: OPTOMETRIST

## 2023-07-11 PROCEDURE — 92134 CPTRZ OPH DX IMG PST SGM RTA: CPT | Performed by: OPTOMETRIST

## 2023-07-11 ASSESSMENT — INTRAOCULAR PRESSURE
OS: 33
OD: 27
OD: 25
OS: 34

## 2023-07-11 NOTE — IMPRESSION/PLAN
Impression: Other secondary cataract, bilateral: H26.493. Plan: Discussed diagnosis in detail with patient. Discussed treatment options with patient. Discussed risks and benefits and patient understands. Pt. will consider YAG laser OS. OD vision limited by retina. Monitor.

## 2023-07-11 NOTE — IMPRESSION/PLAN
Impression: DM2 PDR w TRD detach OU -- Rpr'd w VIT/ TRD rpr Lsr Air OS then OD '21 -- RE-ATTACHED (Repeat VIT /retinectomy/lsr 5k OIL OD '22) Plan: hx: [[20y H/o DM - Recent dx w HTN w Dialysis / future renal Txplt - PVR/traction w PDR OU severe - was OFF MEDS FOR MANY YRS in [facility] - MUST CONTROL BP/BG - MUST STABILIZE for surg . . in '21 IMPRV'd. DONE w OU EXTENSIVE RD TRD rpr surg - 1st eye OS Nov '21 - VIT/LSER PRP/TRD rpr /Avstn/ AFX OS -- THEN 2nd eye OD Dec '21 VIT / [employer] / rmv'l Hmg / TRD repair / AFx OD --- ATTACHED]] Stable today. Reassurance. Will leave oil OD per retina.

## 2023-07-11 NOTE — IMPRESSION/PLAN
Impression: Open angle with borderline findings, high risk, bilateral: H40.023. Plan: Pt. has seen glc. No h/o tx. IOP elevated  OU today. No rubeosis evident OU. Tests limited by PRP/RD repair OU. NFL OCT: early inf thinning OD, early sup thinning and thin inf OS. Begin [place] qhs OU. RTC 3 weeks for IOP check.

## 2023-10-24 ENCOUNTER — OFFICE VISIT (OUTPATIENT)
Dept: URBAN - METROPOLITAN AREA CLINIC 10 | Facility: CLINIC | Age: 43
End: 2023-10-24
Payer: COMMERCIAL

## 2023-10-24 DIAGNOSIS — E11.3533 TYPE 2 DIABETES MELLITUS WITH PROLIFERATIVE DIABETIC RETINOPATHY WITH TRACTION RETINAL DETACHMENT NOT INVOLVING THE MACULA, BILATERAL: ICD-10-CM

## 2023-10-24 DIAGNOSIS — H26.493 OTHER SECONDARY CATARACT, BILATERAL: ICD-10-CM

## 2023-10-24 DIAGNOSIS — H40.1134 PRIMARY OPEN-ANGLE GLAUCOMA, INDETERMINATE, BILATERAL: Primary | ICD-10-CM

## 2023-10-24 DIAGNOSIS — H40.023 OPEN ANGLE WITH BORDERLINE FINDINGS, HIGH RISK, BILATERAL: ICD-10-CM

## 2023-10-24 PROCEDURE — 92020 GONIOSCOPY: CPT | Performed by: OPTOMETRIST

## 2023-10-24 PROCEDURE — 92083 EXTENDED VISUAL FIELD XM: CPT | Performed by: OPTOMETRIST

## 2023-10-24 PROCEDURE — 99214 OFFICE O/P EST MOD 30 MIN: CPT | Performed by: OPTOMETRIST

## 2023-10-24 ASSESSMENT — INTRAOCULAR PRESSURE
OS: 44
OS: 45
OD: 26

## 2023-11-01 ENCOUNTER — OFFICE VISIT (OUTPATIENT)
Dept: URBAN - METROPOLITAN AREA CLINIC 10 | Facility: CLINIC | Age: 43
End: 2023-11-01
Payer: COMMERCIAL

## 2023-11-01 DIAGNOSIS — H26.493 OTHER SECONDARY CATARACT, BILATERAL: ICD-10-CM

## 2023-11-01 DIAGNOSIS — E11.3533 TYPE 2 DIABETES MELLITUS WITH PROLIFERATIVE DIABETIC RETINOPATHY WITH TRACTION RETINAL DETACHMENT NOT INVOLVING THE MACULA, BILATERAL: ICD-10-CM

## 2023-11-01 DIAGNOSIS — H40.1134 PRIMARY OPEN-ANGLE GLAUCOMA, INDETERMINATE, BILATERAL: Primary | ICD-10-CM

## 2023-11-01 PROCEDURE — 92133 CPTRZD OPH DX IMG PST SGM ON: CPT | Performed by: OPTOMETRIST

## 2023-11-01 PROCEDURE — 92134 CPTRZ OPH DX IMG PST SGM RTA: CPT | Performed by: OPTOMETRIST

## 2023-11-01 PROCEDURE — 99213 OFFICE O/P EST LOW 20 MIN: CPT | Performed by: OPTOMETRIST

## 2023-11-01 RX ORDER — BRINZOLAMIDE/BRIMONIDINE TARTRATE 10; 2 MG/ML; MG/ML
SUSPENSION/ DROPS OPHTHALMIC
Qty: 2.5 | Refills: 3 | Status: ACTIVE
Start: 2023-11-01

## 2023-11-01 RX ORDER — LATANOPROSTENE BUNOD 0.24 MG/ML
0.024 % SOLUTION/ DROPS OPHTHALMIC
Qty: 2.5 | Refills: 3 | Status: ACTIVE
Start: 2023-11-01

## 2023-11-01 ASSESSMENT — INTRAOCULAR PRESSURE
OS: 19
OS: 20
OD: 20
OD: 18

## 2023-11-01 ASSESSMENT — VISUAL ACUITY
OS: 20/60
OD: 20/150

## 2023-12-18 ENCOUNTER — SURGERY (OUTPATIENT)
Dept: URBAN - METROPOLITAN AREA SURGERY 5 | Facility: SURGERY | Age: 43
End: 2023-12-18
Payer: COMMERCIAL

## 2023-12-18 PROCEDURE — 66821 AFTER CATARACT LASER SURGERY: CPT | Performed by: OPHTHALMOLOGY

## 2024-01-24 ENCOUNTER — POST-OPERATIVE VISIT (OUTPATIENT)
Dept: URBAN - METROPOLITAN AREA CLINIC 10 | Facility: CLINIC | Age: 44
End: 2024-01-24
Payer: COMMERCIAL

## 2024-01-24 DIAGNOSIS — H40.1133 PRIMARY OPEN-ANGLE GLAUCOMA, SEVERE STAGE, BILATERAL: Primary | ICD-10-CM

## 2024-01-24 DIAGNOSIS — E11.3533 TYPE 2 DIABETES MELLITUS WITH PROLIFERATIVE DIABETIC RETINOPATHY WITH TRACTION RETINAL DETACHMENT NOT INVOLVING THE MACULA, BILATERAL: ICD-10-CM

## 2024-01-24 DIAGNOSIS — H26.491 OTHER SECONDARY CATARACT, RIGHT EYE: ICD-10-CM

## 2024-01-24 DIAGNOSIS — H52.223 REGULAR ASTIGMATISM, BILATERAL: ICD-10-CM

## 2024-01-24 PROCEDURE — 99213 OFFICE O/P EST LOW 20 MIN: CPT | Performed by: OPTOMETRIST

## 2024-01-24 PROCEDURE — 92083 EXTENDED VISUAL FIELD XM: CPT | Performed by: OPTOMETRIST

## 2024-01-24 RX ORDER — BRINZOLAMIDE/BRIMONIDINE TARTRATE 10; 2 MG/ML; MG/ML
SUSPENSION/ DROPS OPHTHALMIC
Qty: 5 | Refills: 3 | Status: ACTIVE
Start: 2024-01-24

## 2024-01-24 ASSESSMENT — INTRAOCULAR PRESSURE
OD: 23
OS: 23
OD: 21
OS: 24

## 2024-01-24 ASSESSMENT — VISUAL ACUITY
OS: 20/30
OD: 20/100

## 2024-02-19 ENCOUNTER — OFFICE VISIT (OUTPATIENT)
Dept: URBAN - METROPOLITAN AREA CLINIC 10 | Facility: CLINIC | Age: 44
End: 2024-02-19
Payer: COMMERCIAL

## 2024-02-19 DIAGNOSIS — H26.491 OTHER SECONDARY CATARACT, RIGHT EYE: ICD-10-CM

## 2024-02-19 DIAGNOSIS — H40.1133 PRIMARY OPEN-ANGLE GLAUCOMA, SEVERE STAGE, BILATERAL: Primary | ICD-10-CM

## 2024-02-19 PROCEDURE — 99213 OFFICE O/P EST LOW 20 MIN: CPT | Performed by: OPTOMETRIST

## 2024-02-19 ASSESSMENT — INTRAOCULAR PRESSURE
OD: 15
OS: 19

## 2024-04-15 ENCOUNTER — OFFICE VISIT (OUTPATIENT)
Dept: URBAN - METROPOLITAN AREA CLINIC 10 | Facility: CLINIC | Age: 44
End: 2024-04-15
Payer: COMMERCIAL

## 2024-04-15 DIAGNOSIS — H40.1133 PRIMARY OPEN-ANGLE GLAUCOMA, SEVERE STAGE, BILATERAL: Primary | ICD-10-CM

## 2024-04-15 DIAGNOSIS — H26.491 OTHER SECONDARY CATARACT, RIGHT EYE: ICD-10-CM

## 2024-04-15 PROCEDURE — 99213 OFFICE O/P EST LOW 20 MIN: CPT | Performed by: OPTOMETRIST

## 2024-04-15 RX ORDER — TIMOLOL MALEATE 5 MG/ML
0.5 % SOLUTION/ DROPS OPHTHALMIC
Qty: 5 | Refills: 3 | Status: ACTIVE
Start: 2024-04-15

## 2024-04-15 RX ORDER — BRINZOLAMIDE/BRIMONIDINE TARTRATE 10; 2 MG/ML; MG/ML
SUSPENSION/ DROPS OPHTHALMIC
Qty: 15 | Refills: 3 | Status: ACTIVE
Start: 2024-04-15

## 2024-04-15 ASSESSMENT — INTRAOCULAR PRESSURE
OS: 22
OD: 16
OD: 15
OS: 23

## 2024-06-18 ENCOUNTER — OFFICE VISIT (OUTPATIENT)
Dept: URBAN - METROPOLITAN AREA CLINIC 10 | Facility: CLINIC | Age: 44
End: 2024-06-18
Payer: COMMERCIAL

## 2024-06-18 DIAGNOSIS — H40.1133 PRIMARY OPEN-ANGLE GLAUCOMA, SEVERE STAGE, BILATERAL: Primary | ICD-10-CM

## 2024-06-18 PROCEDURE — 99213 OFFICE O/P EST LOW 20 MIN: CPT | Performed by: OPTOMETRIST

## 2024-06-18 ASSESSMENT — INTRAOCULAR PRESSURE
OS: 19
OD: 14
OD: 15
OS: 17

## 2024-10-17 ENCOUNTER — OFFICE VISIT (OUTPATIENT)
Dept: URBAN - METROPOLITAN AREA CLINIC 10 | Facility: CLINIC | Age: 44
End: 2024-10-17
Payer: COMMERCIAL

## 2024-10-17 DIAGNOSIS — H40.1133 PRIMARY OPEN-ANGLE GLAUCOMA, SEVERE STAGE, BILATERAL: Primary | ICD-10-CM

## 2024-10-17 DIAGNOSIS — H26.491 OTHER SECONDARY CATARACT, RIGHT EYE: ICD-10-CM

## 2024-10-17 PROCEDURE — 99213 OFFICE O/P EST LOW 20 MIN: CPT | Performed by: OPTOMETRIST

## 2024-10-17 PROCEDURE — 92083 EXTENDED VISUAL FIELD XM: CPT | Performed by: OPTOMETRIST

## 2024-10-17 ASSESSMENT — INTRAOCULAR PRESSURE
OS: 25
OD: 23

## 2024-11-08 ENCOUNTER — OFFICE VISIT (OUTPATIENT)
Dept: URBAN - METROPOLITAN AREA CLINIC 10 | Facility: CLINIC | Age: 44
End: 2024-11-08
Payer: COMMERCIAL

## 2024-11-08 DIAGNOSIS — H26.491 OTHER SECONDARY CATARACT, RIGHT EYE: ICD-10-CM

## 2024-11-08 DIAGNOSIS — H40.1133 PRIMARY OPEN-ANGLE GLAUCOMA, SEVERE STAGE, BILATERAL: Primary | ICD-10-CM

## 2024-11-08 PROCEDURE — 99213 OFFICE O/P EST LOW 20 MIN: CPT | Performed by: OPTOMETRIST

## 2024-11-08 ASSESSMENT — INTRAOCULAR PRESSURE
OD: 15
OS: 17

## 2025-04-11 ENCOUNTER — OFFICE VISIT (OUTPATIENT)
Dept: URBAN - METROPOLITAN AREA CLINIC 10 | Facility: CLINIC | Age: 45
End: 2025-04-11
Payer: COMMERCIAL

## 2025-04-11 DIAGNOSIS — H40.1133 PRIMARY OPEN-ANGLE GLAUCOMA, SEVERE STAGE, BILATERAL: Primary | ICD-10-CM

## 2025-04-11 PROCEDURE — 99213 OFFICE O/P EST LOW 20 MIN: CPT | Performed by: OPTOMETRIST

## 2025-04-11 ASSESSMENT — INTRAOCULAR PRESSURE
OD: 13
OS: 16
OD: 15
OS: 25

## 2025-05-09 ENCOUNTER — OFFICE VISIT (OUTPATIENT)
Dept: URBAN - METROPOLITAN AREA CLINIC 10 | Facility: CLINIC | Age: 45
End: 2025-05-09
Payer: COMMERCIAL

## 2025-05-09 DIAGNOSIS — H40.1133 PRIMARY OPEN-ANGLE GLAUCOMA, SEVERE STAGE, BILATERAL: Primary | ICD-10-CM

## 2025-05-09 PROCEDURE — 99213 OFFICE O/P EST LOW 20 MIN: CPT | Performed by: OPTOMETRIST

## 2025-05-09 RX ORDER — TIMOLOL MALEATE 5 MG/ML
0.5 % SOLUTION/ DROPS OPHTHALMIC
Qty: 5 | Refills: 5 | Status: ACTIVE
Start: 2025-05-09

## 2025-05-09 RX ORDER — LATANOPROSTENE BUNOD 0.24 MG/ML
0.024 % SOLUTION/ DROPS OPHTHALMIC
Qty: 3 | Refills: 5 | Status: ACTIVE
Start: 2025-05-09

## 2025-05-09 RX ORDER — BRINZOLAMIDE/BRIMONIDINE TARTRATE 10; 2 MG/ML; MG/ML
SUSPENSION/ DROPS OPHTHALMIC
Qty: 8 | Refills: 3 | Status: ACTIVE
Start: 2025-05-09

## 2025-05-09 ASSESSMENT — INTRAOCULAR PRESSURE
OS: 17
OD: 14